# Patient Record
Sex: FEMALE | HISPANIC OR LATINO | URBAN - METROPOLITAN AREA
[De-identification: names, ages, dates, MRNs, and addresses within clinical notes are randomized per-mention and may not be internally consistent; named-entity substitution may affect disease eponyms.]

---

## 2018-05-17 ENCOUNTER — OFFICE VISIT (OUTPATIENT)
Dept: MEDICAL GROUP | Facility: PHYSICIAN GROUP | Age: 58
End: 2018-05-17
Payer: COMMERCIAL

## 2018-05-17 VITALS
SYSTOLIC BLOOD PRESSURE: 122 MMHG | HEIGHT: 62 IN | HEART RATE: 66 BPM | DIASTOLIC BLOOD PRESSURE: 74 MMHG | OXYGEN SATURATION: 97 % | TEMPERATURE: 97.7 F | WEIGHT: 186 LBS | BODY MASS INDEX: 34.23 KG/M2

## 2018-05-17 DIAGNOSIS — Z00.00 ANNUAL PHYSICAL EXAM: ICD-10-CM

## 2018-05-17 DIAGNOSIS — Z12.11 SCREEN FOR COLON CANCER: ICD-10-CM

## 2018-05-17 DIAGNOSIS — Z23 NEED FOR VACCINATION: ICD-10-CM

## 2018-05-17 DIAGNOSIS — Z12.31 SCREENING MAMMOGRAM, ENCOUNTER FOR: ICD-10-CM

## 2018-05-17 DIAGNOSIS — E66.9 OBESITY (BMI 30-39.9): ICD-10-CM

## 2018-05-17 PROCEDURE — 99386 PREV VISIT NEW AGE 40-64: CPT | Performed by: FAMILY MEDICINE

## 2018-05-17 PROCEDURE — 90471 IMMUNIZATION ADMIN: CPT | Performed by: FAMILY MEDICINE

## 2018-05-17 PROCEDURE — 90715 TDAP VACCINE 7 YRS/> IM: CPT | Performed by: FAMILY MEDICINE

## 2018-05-17 ASSESSMENT — PATIENT HEALTH QUESTIONNAIRE - PHQ9: CLINICAL INTERPRETATION OF PHQ2 SCORE: 0

## 2018-05-17 ASSESSMENT — PAIN SCALES - GENERAL: PAINLEVEL: NO PAIN

## 2018-05-18 NOTE — PROGRESS NOTES
Cha Love is a 58 y.o. female here for establishing care and annual physical exam    HPI:      No known chronic medical problems.Does not take any medications.No complaints today.Had labwork, colonoscopy and Pap few years ago and did not have any abnormalities.States that she eats lot of vegetables but also eats a lot of bread and usually portions are big.Does not exercise.No physical complaints.    Current medicines (including changes today)  No current outpatient prescriptions on file.     No current facility-administered medications for this visit.      She  has no past medical history of Asthma.  She  has a past surgical history that includes primary c section.  Social History   Substance Use Topics   • Smoking status: Light Tobacco Smoker   • Smokeless tobacco: Never Used   • Alcohol use No     Social History     Social History Narrative   • No narrative on file     Family History   Problem Relation Age of Onset   • Diabetes Mother    • Diabetes Father    • Cancer Brother      unknown   • Cancer Maternal Grandmother      GI ca     Family Status   Relation Status   • Mother Alive   • Father    • Brother    • Maternal Grandmother          ROS    Denies fever, chills, sweats, recent weight change  Skin: negative for rash, scaling, itching, pigmentation, hair or nail changes.  Eyes: negative for visual blurring, double vision, eye pain, floaters and discharge from eyes  Ears/Nose/Throat: negative for tinnitus, vertigo, bleeding gums, dental problem and hoarseness, frequent URI's, sinus trouble, persistent sore throat  Respiratory: negative for persistent cough, hemoptysis, dyspnea  Cardiovascular: negative for palpitations, irregular heart beat, chest pain, or peripheral edema.  Gastrointestinal: negative for poor appetite, dysphagia, nausea, heartburn or reflux, abdominal pain, constipation or diarrhea  Genitourinary: negative for nocturia, dysuria, frequency, hesitancy,  "incontinence  Musculoskeletal: negative for joint pain/swelling and muscle pain/ soreness  Neurologic: negative for headaches, involuntary movements or tremor,muscle weakness  Psychiatric: negative for sleep disturbance, anxiety, depression  Hematologic/Lymphatic/Immunologic: negative for anemia, unusual bruising, swollen glands  Endocrine: negative for temperature intolerance, polydipsia, polyuria, unintentional weight changes.          Objective:     Blood pressure 122/74, pulse 66, temperature 36.5 °C (97.7 °F), height 1.575 m (5' 2\"), weight 84.4 kg (186 lb), SpO2 97 %. Body mass index is 34.02 kg/m².  Physical Exam:    GEN: Alert and oriented,well appearing, no acute distress  SKIN: warm, dry to touch, no rashes or lesions in visible areas  PSYCH: mood and affect normal, judgement normal  EYES: Conjunctiva clear, lids normal,pupils equal round and reactive  ENMT: Normal external nose and ears,EACs normal appearing, TM pearly gray with normal light reflex bilaterally,nasal mucosa and turbinates normal appearing without erythema or edema, lips without lesions,good dentition,oropharynx clear  Neck : Trachea midline, no masses or swelling, no thyromegaly  LYMPHATIC : No cervical or supraclavicular lymphadenopathy  RESPIRATORY : Unlabored respiratory effort, no distress noted, clear to auscultation bilaterally, no wheeze, rhonchi, crackles  CARDIOVASCULAR: RRR, S1 S2 normal, no murmurs, no edema of the extremities  MUSCULOSKELETAL : Normal gait and stance, no obvious abnormalities   NEURO: No overt focal neurologic deficits,sensation intact      Assessment and Plan:   The following treatment plan was discussed    1. Annual physical exam  Exam normal as above.Routine labs as above.  - CBC WITH DIFFERENTIAL; Future  - COMP METABOLIC PANEL; Future  - LIPID PROFILE; Future  - TSH WITH REFLEX TO FT4; Future    2. Obesity (BMI 30-39.9)  Discussed in length about weight loss through healthy diet, portion control and " regular physical activity.  - Patient identified as having weight management issue.  Appropriate orders and counseling given.    3. Screening mammogram, encounter for  - MA-SCREEN MAMMO W/CAD-BILAT; Future    4. Screen for colon cancer  - REFERRAL TO GI FOR COLONOSCOPY    5. Need for vaccination  Tdap today.Shingrix not available in clinic today.To be completed at next visit.  - TDAP VACCINE =>8YO IM        Followup: Return in about 4 weeks (around 6/14/2018) for Pap, GYN Exam.         Please note that this dictation was created using voice recognition software. I have made every reasonable attempt to correct obvious errors, but I expect that there are errors of grammar and possibly content that I did not discover before finalizing the note.

## 2018-06-15 ENCOUNTER — APPOINTMENT (OUTPATIENT)
Dept: RADIOLOGY | Facility: MEDICAL CENTER | Age: 58
End: 2018-06-15
Attending: FAMILY MEDICINE
Payer: COMMERCIAL

## 2018-06-19 ENCOUNTER — OFFICE VISIT (OUTPATIENT)
Dept: MEDICAL GROUP | Facility: PHYSICIAN GROUP | Age: 58
End: 2018-06-19
Payer: COMMERCIAL

## 2018-06-19 ENCOUNTER — HOSPITAL ENCOUNTER (OUTPATIENT)
Facility: MEDICAL CENTER | Age: 58
End: 2018-06-19
Attending: FAMILY MEDICINE
Payer: COMMERCIAL

## 2018-06-19 VITALS
TEMPERATURE: 97.9 F | HEART RATE: 86 BPM | OXYGEN SATURATION: 93 % | HEIGHT: 62 IN | SYSTOLIC BLOOD PRESSURE: 116 MMHG | BODY MASS INDEX: 34.96 KG/M2 | WEIGHT: 190 LBS | DIASTOLIC BLOOD PRESSURE: 62 MMHG

## 2018-06-19 DIAGNOSIS — Z01.419 ENCOUNTER FOR WELL WOMAN EXAM WITH ROUTINE GYNECOLOGICAL EXAM: ICD-10-CM

## 2018-06-19 DIAGNOSIS — Z11.51 SCREENING FOR HPV (HUMAN PAPILLOMAVIRUS): ICD-10-CM

## 2018-06-19 DIAGNOSIS — Z12.4 CERVICAL CANCER SCREENING: ICD-10-CM

## 2018-06-19 PROCEDURE — 99396 PREV VISIT EST AGE 40-64: CPT | Performed by: FAMILY MEDICINE

## 2018-06-19 PROCEDURE — 88175 CYTOPATH C/V AUTO FLUID REDO: CPT

## 2018-06-19 PROCEDURE — 87624 HPV HI-RISK TYP POOLED RSLT: CPT

## 2018-06-19 ASSESSMENT — PAIN SCALES - GENERAL: PAINLEVEL: NO PAIN

## 2018-06-19 NOTE — PROGRESS NOTES
SUBJECTIVE: 58 y.o. female for annual routine gynecologic exam  Chief Complaint   Patient presents with   • Gynecologic Exam       Obstetric History       T0      L3     SAB0   TAB0   Ectopic0   Molar0   Multiple0   Live Births0       Last Pap: few years back in [place]  History   Sexual Activity   • Sexual activity: Not on file     Sexual history: denies being sexually active   H/O Abnormal Pap no  She  reports that she has been smoking Cigarettes.  She has never used smokeless tobacco.        Allergies: Patient has no known allergies.     ROS:    Reports none menopause symptoms of hot flashes, night sweats, sleep disruption, mood changes.Denies vaginal dryness.   No significant bloating/fluid retention, pelvic pain, or dyspareunia. No vaginal discharge   No breast tenderness, mass, nipple discharge, changes in size or contour, or abnormal cyclic discomfort.  No urinary tract symptoms, no incontinence, no polydipsia, polyuria,  No abdominal pain, change in bowel habits, black or bloody stools.    No unusual headaches, no visual changes, menstrual migraines   No prolonged cough. No dyspnea or chest pain on exertion.  No depression, labile mood, anxiety, libido changes, insomnia.  No temperature intolerance.  No new/concerning skin lesions, concerns.     Exercise: no regular exercise program  Preventive Care:Patient will schedule her Colonoscopy and Mammogram and get labs completed    Current medicines (including changes today)  No current outpatient prescriptions on file.     No current facility-administered medications for this visit.      She  has no past medical history of Asthma.  She  has a past surgical history that includes primary c section.     Family History:   Family History   Problem Relation Age of Onset   • Diabetes Mother    • Diabetes Father    • Cancer Brother      unknown   • Cancer Maternal Grandmother      GI ca       OBJECTIVE:   /62   Pulse 86   Temp 36.6 °C (97.9 °F)   Ht  "1.575 m (5' 2\")   Wt 86.2 kg (190 lb)   SpO2 93%   BMI 34.75 kg/m²   Body mass index is 34.75 kg/m².    HEAD AND NECK:  Ears normal.  Throat, oral cavity and tongue normal.  Neck supple. No adenopathy or masses in the neck or supraclavicular regions.  No carotid bruits. No thyromegaly. NEURO: Cranial nerves are normal. DTR's normal and symmetric.  CHEST:  Clear, good air entry, no wheezes or rales. HEART:  Regular rate and rhythm.  S1 and S2 normal.   No edema or JVD. ABDOMEN:  Soft without tenderness, guarding, mass or organomegaly.  No CVA tenderness or inguinal adenopathy. EXTREMITIES:  Extremities, reflexes and peripheral pulses are normal. SKIN: color normal, vascularity normal, no edema, temperature normal   No rashes or suspicious skin lesions noted.     Breast Exam: Performed with instruction during examination. No axillary lymphadenopathy, no skin changes, no dominant masses. No nipple retraction  Pelvic Exam -  Normal external genitalia with no lesions. Vaginal Mucosa:  normal vaginal mucosa . Cervix with no visible lesions. No cervical motion tenderness. Uterus is normal sized with no masses. No adnexal tenderness or enlargement appreciated. Thin Prep Pap is obtained and specimen(s) sent to lab    <ASSESSMENT and PLAN>  1. Encounter for well woman exam with routine gynecological exam  THINPREP PAP WITH HPV   2. Cervical cancer screening  THINPREP PAP WITH HPV   3. Screening for HPV (human papillomavirus)  THINPREP PAP WITH HPV       Discussed  breast self exam, mammography screening, feminine hygiene, diet and exercise   Follow-up in 1 years for next Gyn exam and 5 years for next Pap.   Next office visit for recheck of chronic medical conditions is due in 6 months      Please note that this dictation was created using voice recognition software. I have made every reasonable attempt to correct obvious errors, but I expect that there are errors of grammar and possibly content that I did not discover " before finalizing the note.

## 2018-06-20 LAB
CYTOLOGY REG CYTOL: NORMAL
HPV HR 12 DNA CVX QL NAA+PROBE: NEGATIVE
HPV16 DNA SPEC QL NAA+PROBE: NEGATIVE
HPV18 DNA SPEC QL NAA+PROBE: NEGATIVE
SPECIMEN SOURCE: NORMAL

## 2018-06-25 ENCOUNTER — TELEPHONE (OUTPATIENT)
Dept: MEDICAL GROUP | Facility: PHYSICIAN GROUP | Age: 58
End: 2018-06-25

## 2018-06-25 NOTE — LETTER
"June 25, 2018         Cha Ivan  [address] [facility] [place] [address]        Dear Cha:  Your pap smear is normal.   Dr. Hui covering for Dr. Schneider     Below are the results from your recent visit:    Resulted Orders   THINPREP PAP WITH HPV   Result Value Ref Range    Cytology Reg See Path Report       Comment:      A separate pathology report will follow after the Cytology  specimen has been received by the laboratory and the results  are signed out by a pathologist.  Please see \"Pathology GYN Specimen\" order for these results.      Source Cervical     HPV Genotype 16 Negative Negative    HPV Genotype 18 Negative Negative    HPV Other High Risk Genotypes Negative Negative      Comment:      This test detects the high-risk HPV types 16, 18, 31, 33,  35, 39, 45, 51, 52, 56, 58, 59, 66, and 68. It is intended  for use in women 21 years and older with ASC-US cervical  cytology results and in women 30 years and older with  positive high-risk HPV results. Sensitivity may be affected  by specimen collection methods, stage of infection, and the  presence of interfering substances. Results should be  interpreted in conjunction with other available laboratory  and clinical data.      Narrative    Clinical Information:->01 Routine Check-up   PATHOLOGY GYN SPECIMEN    Narrative    [facility]    DEPARTMENT OF PATHOLOGY                   [address] [place] [address]  [address]              Phone [phone]          Fax [phone]   David Finley M.D.     Shawna Wayne M.D.     Isadora Lopez M.D.   Shawna Dubon M.D.     Mauricio Castanon M.D.     Mike Mcpherson M.D.  Patient:    CHA DOAN         Specimen    [id number]                                           #:      Copies to:                         CERVICOVAGINAL CYTOLOGY REPORT      INTERPRETATION:  NEGATIVE FOR INTRAEPITHELIAL LESION OR MALIGNANCY.    SPECIMEN ADEQUACY:  Satisfactory for Evaluation.  No " endocervical cells/transformation zone  component present.                                        Report electronically signed by:                                      ODELL HAWK(ASCP)  ------------------------------------------------------------------------    Z01.419 Z12.4 Z11.51  This liquid based ThinPrep Pap test is screened with the use of an  image guided system.  Cervicovaginal Papanicolaou (Pap) smears are  screening tests with a well-documented false-negative rate.  A single  negative test is significantly less accurate than multiple negative  tests in successive screening periods.                                   RELATED LABORATORY RESULTS      Ordered by: COLBY Ord Date: 06/19/2018 Ord Time: 13:22  Test Name        Collected  Result          Abnor  Range     Units                    Date                      mal    Specimen Source  06/19/201  Cervical                   8    HPV Genotype 16  06/19/201  Negative               Negative                   8    HPV Genotype 18  06/19/201  Negative               Negative                   8    HPV Other High   06/19/201  Negative               Negative  Risk Genotypes   8        Report electronically signed by:  ODELL HAWK(ASCP)  Diagnosis performed at: [facility]  Pathology  Department, [address] [place] [address]  [id number]      Printed 00/00/0000 [id number] ROSANNE DOAN   Page 1 of 1 MRN#:[id number]       If you have any questions or concerns, please don't hesitate to call.        Sincerely,      Aisha Schneider M.D.    Electronically Signed

## 2018-06-25 NOTE — TELEPHONE ENCOUNTER
----- Message from Martha Hui M.D. sent at 6/24/2018  7:44 PM PDT -----  Normal pap smear.  Dr. Hui covering for Dr. Schneider

## 2019-04-17 ENCOUNTER — HOSPITAL ENCOUNTER (OUTPATIENT)
Facility: MEDICAL CENTER | Age: 59
End: 2019-04-17
Payer: COMMERCIAL

## 2019-04-17 ENCOUNTER — TELEPHONE (OUTPATIENT)
Dept: MEDICAL GROUP | Facility: PHYSICIAN GROUP | Age: 59
End: 2019-04-17

## 2019-04-17 LAB
CHOLEST SERPL-MCNC: 224 MG/DL (ref 100–199)
GLUCOSE SERPL-MCNC: 105 MG/DL (ref 65–99)
HDLC SERPL-MCNC: 58 MG/DL
LDLC SERPL CALC-MCNC: 141 MG/DL
TRIGL SERPL-MCNC: 124 MG/DL (ref 0–149)

## 2019-04-17 NOTE — TELEPHONE ENCOUNTER
----- Message from Jered Becker M.D. sent at 4/17/2019  2:10 PM PDT -----  Dear Cha Zimmer is no longer working at [facility].  Your cholesterol levels and your blood sugar are both up.  Please make an appointment to be seen by another provider at [facility] to deal with these results.    Jered Becker MD     Head Injury

## 2019-04-17 NOTE — LETTER
April 17, 2019         Cha Love  [address] [facility] [address]        Dear Cha,      Dr. Zimmer is no longer working at [facility].  Your cholesterol levels and your blood sugar are both up.  Please make an appointment to be seen by another provider at [facility] to deal with these results.     Jered Becker MD     Resulted Orders   Lipid Profile   Result Value Ref Range    Cholesterol,Tot 224 (H) 100 - 199 mg/dL    Triglycerides 124 0 - 149 mg/dL    HDL 58 >=40 mg/dL     (H) <100 mg/dL   Blood Glucose   Result Value Ref Range    Glucose 105 (H) 65 - 99 mg/dL         Electronically Signed